# Patient Record
Sex: FEMALE
[De-identification: names, ages, dates, MRNs, and addresses within clinical notes are randomized per-mention and may not be internally consistent; named-entity substitution may affect disease eponyms.]

---

## 2020-11-11 ENCOUNTER — NURSE TRIAGE (OUTPATIENT)
Dept: OTHER | Facility: CLINIC | Age: 65
End: 2020-11-11

## 2020-11-12 NOTE — TELEPHONE ENCOUNTER
Reason for Disposition   Patient sounds very sick or weak to the triager    Answer Assessment - Initial Assessment Questions  1. NAUSEA SEVERITY: \"How bad is the nausea? \" (e.g., mild, moderate, severe; dehydration, weight loss)    - MILD: loss of appetite without change in eating habits    - MODERATE: decreased oral intake without significant weight loss, dehydration, or malnutrition    - SEVERE: inadequate caloric or fluid intake, significant weight loss, symptoms of dehydration      Mild    2. ONSET: \"When did the nausea begin? \"     A few hours ago. 3. VOMITING: \"Any vomiting? \" If so, ask: \"How many times today? \"      No vomiting    4. RECURRENT SYMPTOM: \"Have you had nausea before? \" If so, ask: \"When was the last time? \" \"What happened that time? \"      No    5. CAUSE: \"What do you think is causing the nausea? \"      Unknown      6. PREGNANCY: \"Is there any chance you are pregnant? \" (e.g., unprotected intercourse, missed birth control pill, broken condom)      N/A    Protocols used: NAUSEA-ADULT-AH    Pt reprots new onset nausea, sweats, and dizziness for a few hours. States she feels weak. Reviewed for pt to be evaluated at the ED. States she has no one to drive her. Encouraged pt to contact 911 for assistance.